# Patient Record
Sex: FEMALE | URBAN - METROPOLITAN AREA
[De-identification: names, ages, dates, MRNs, and addresses within clinical notes are randomized per-mention and may not be internally consistent; named-entity substitution may affect disease eponyms.]

---

## 2021-05-24 ENCOUNTER — OFFICE VISIT (OUTPATIENT)
Dept: URBAN - METROPOLITAN AREA CLINIC 10 | Facility: CLINIC | Age: 71
End: 2021-05-24
Payer: MEDICARE

## 2021-05-24 DIAGNOSIS — H43.813 VITREOUS DEGENERATION, BILATERAL: ICD-10-CM

## 2021-05-24 DIAGNOSIS — H26.493 OTHER SECONDARY CATARACT, BILATERAL: ICD-10-CM

## 2021-05-24 DIAGNOSIS — E11.3213 TYPE 2 DIAB W MILD NONPRLF DIABETIC RTNOP W MACULAR EDEMA, BILATERAL: Primary | ICD-10-CM

## 2021-05-24 PROCEDURE — 92134 CPTRZ OPH DX IMG PST SGM RTA: CPT | Performed by: OPTOMETRIST

## 2021-05-24 PROCEDURE — 99204 OFFICE O/P NEW MOD 45 MIN: CPT | Performed by: OPTOMETRIST

## 2021-05-24 ASSESSMENT — VISUAL ACUITY
OD: 20/20
OS: 20/20

## 2021-05-24 ASSESSMENT — INTRAOCULAR PRESSURE
OS: 14
OD: 12

## 2021-05-24 NOTE — IMPRESSION/PLAN
Impression: Other secondary cataract, bilateral: H26.493. Plan: No treatment currently recommended due to level of vision. Patient will monitor vision changes and contact us with any decrease in vision.

## 2021-05-24 NOTE — IMPRESSION/PLAN
Impression: Vitreous degeneration, bilateral: H43.813. Plan: There is no evidence of retinal pathology. All symptoms of retinal detachment or tears were discussed in detail. If pt. notices any symptoms discussed, contact office immediately. Recommend pt. return for normal recall.

## 2021-05-24 NOTE — IMPRESSION/PLAN
Impression: Type 2 diab w mild nonprlf diabetic rtnop w macular edema, bilateral: O99.3225. Plan: Stressed importance of BG control. Notes to PCP. See retina team for evaluation and treatment within next few weeks.

## 2022-07-05 ENCOUNTER — OFFICE VISIT (OUTPATIENT)
Dept: URBAN - METROPOLITAN AREA CLINIC 8 | Facility: CLINIC | Age: 72
End: 2022-07-05
Payer: MEDICARE

## 2022-07-05 DIAGNOSIS — E11.3293 DIABETES MELLITUS TYPE 2 WITH MILD NON-PROLIFERATIVE RETINOPATHY WITHOUT MACULAR EDEMA, BILATERAL: Primary | ICD-10-CM

## 2022-07-05 PROCEDURE — 99204 OFFICE O/P NEW MOD 45 MIN: CPT | Performed by: OPHTHALMOLOGY

## 2022-07-05 ASSESSMENT — VISUAL ACUITY
OD: 20/20
OS: 20/25

## 2022-07-05 ASSESSMENT — INTRAOCULAR PRESSURE
OD: 21
OS: 22
OD: 22
OS: 19

## 2022-07-05 NOTE — IMPRESSION/PLAN
Impression: Diabetes mellitus Type 2 with mild non-proliferative retinopathy without macular edema, bilateral: A33.9468. Plan: Diabetes type I: mild background diabetic retinopathy, no signs of neovascularization noted. No treatment necessary at this time. Patient was instructed to monitor vision for sudden changes and to call if any changes noted. Discussed ocular and systemic benefits of blood sugar control.

## 2023-01-24 ENCOUNTER — OFFICE VISIT (OUTPATIENT)
Dept: URBAN - METROPOLITAN AREA CLINIC 8 | Facility: CLINIC | Age: 73
End: 2023-01-24
Payer: MEDICARE

## 2023-01-24 DIAGNOSIS — E11.3312 DIABETES MELLITUS TYPE 2 WITH MODERATE NON-PROLIFERATIVE RETINOPATHY WITH MACULAR EDEMA, LEFT EYE: ICD-10-CM

## 2023-01-24 DIAGNOSIS — H40.013 OPEN ANGLE WITH BORDERLINE FINDINGS, LOW RISK, BILATERAL: ICD-10-CM

## 2023-01-24 DIAGNOSIS — E11.3291 DIABETES MELLITUS TYPE 2 WITH MILD NON-PROLIFERATIVE RETINOPATHY WITHOUT MACULAR EDEMA, RIGHT EYE: Primary | ICD-10-CM

## 2023-01-24 DIAGNOSIS — H43.393 OTHER VITREOUS OPACITIES, BILATERAL: ICD-10-CM

## 2023-01-24 PROCEDURE — 92134 CPTRZ OPH DX IMG PST SGM RTA: CPT | Performed by: OPHTHALMOLOGY

## 2023-01-24 PROCEDURE — 92133 CPTRZD OPH DX IMG PST SGM ON: CPT | Performed by: OPHTHALMOLOGY

## 2023-01-24 PROCEDURE — 99214 OFFICE O/P EST MOD 30 MIN: CPT | Performed by: OPHTHALMOLOGY

## 2023-01-24 ASSESSMENT — INTRAOCULAR PRESSURE
OD: 21
OS: 22

## 2023-01-24 NOTE — IMPRESSION/PLAN
Impression: Diabetes mellitus Type 2 with mild non-proliferative retinopathy without macular edema, right eye: P71.3111. Plan: Diabetes type II: mild background diabetic retinopathy, no signs of neovascularization noted. No treatment necessary at this time. Patient was instructed to monitor vision for sudden changes and to call if visual changes noted. Discussed ocular and systemic benefits of blood sugar control.

## 2023-01-24 NOTE — IMPRESSION/PLAN
Impression: Open angle with borderline findings, low risk, bilateral: H40.013. Plan: Will continue to observe condition and or symptoms. 
oct-nerve done today

## 2023-01-24 NOTE — IMPRESSION/PLAN
Impression: Diabetes mellitus Type 2 with moderate non-proliferative retinopathy with macular edema, left eye: M23.5253. Plan: Diabetes type II: moderate background diabetic retinopathy, with macular edema. Will refer patient for a retinal consult to determine if treatment is necessary. Discussed ocular and systemic benefits of maintaining blood sugar control.

## 2023-02-20 ENCOUNTER — OFFICE VISIT (OUTPATIENT)
Dept: URBAN - METROPOLITAN AREA CLINIC 7 | Facility: CLINIC | Age: 73
End: 2023-02-20
Payer: MEDICARE

## 2023-02-20 DIAGNOSIS — E11.3313 TYPE 2 DIAB WITH MODERATE NONP RTNOP WITH MACULAR EDEMA, BI: Primary | ICD-10-CM

## 2023-02-20 DIAGNOSIS — H43.813 VITREOUS DEGENERATION, BILATERAL: ICD-10-CM

## 2023-02-20 DIAGNOSIS — Z96.1 PRESENCE OF INTRAOCULAR LENS: ICD-10-CM

## 2023-02-20 PROCEDURE — 99203 OFFICE O/P NEW LOW 30 MIN: CPT | Performed by: OPHTHALMOLOGY

## 2023-02-20 PROCEDURE — 67028 INJECTION EYE DRUG: CPT | Performed by: OPHTHALMOLOGY

## 2023-02-20 PROCEDURE — 92134 CPTRZ OPH DX IMG PST SGM RTA: CPT | Performed by: OPHTHALMOLOGY

## 2023-02-20 ASSESSMENT — INTRAOCULAR PRESSURE
OS: 18
OD: 18

## 2023-02-20 NOTE — IMPRESSION/PLAN
Impression: Type 2 diab with moderate nonp rtnop with macular edema, bi: H63.9805. OCT OU - IRF OD, IRF with PED OS  / 400 Plan: The patient is a type 2 nonproliferative diabetic retinopathy (NPDR) with significant diabetic macular edema. BS/BP control emphasized with patient. At this time, we would recommend an intravitreal injection of anti-VEGF medication. OD: min edema - not CI
OS: sig and CI involving edema 

rec treat OS today and obs OD. D/w patient in detail. May consider clinical trial for NPDR OD at her discretion Discussed R,B,A of Avastin vs Lucentis vs Eylea vs Beovu vs Vabysmo injection. Discussed no FDA approval with Avastin and compounding risk. Discussed signs and symptoms of inflammation, endophthalmitis, vitreous hemorrhage, retinal tear, retinal detachment. Patient understands and wishes to proceed with Avastin injection today. Timeout was performed before procedure. AVASTIN INJECTION COMPLETED TODAY as per protocol without complications. 

1m OCT OU re eval Avastin OS